# Patient Record
(demographics unavailable — no encounter records)

---

## 2024-11-27 NOTE — WORK
[Sprain/Strain] : sprain/strain [Was the competent medical cause of the injury] : was the competent medical cause of the injury [Are consistent with the injury] : are consistent with the injury [Consistent with my objective findings] : consistent with my objective findings [Does not reveal pre-existing condition(s) that may affect treatment/prognosis] : does not reveal pre-existing condition(s) that may affect treatment/prognosis [Climbing stairs/Ladders] : climbing stairs/ladders [Walking] : walking [Unknown at this time] : : unknown at this time [Patient] : patient [I provided the services listed above] :  I provided the services listed above. [FreeTextEntry1] : Fair

## 2024-11-27 NOTE — IMAGING
[Left] : left ankle [de-identified] : He is alert and oriented vital signs stable.  Examination left ankle reveals minimal swelling.  He has no tenderness on the proximal or distal fibula or the medial malleolus.  He has no tenderness around the ATFL or deltoid ligaments.  He has some mild tenderness around the CFL as well as had some tenderness around the peroneal tendons.  There is no apparent peroneal subluxation.  He has slight restricted range of motion eversion with some discomfort on resisted eversion.  He has full dorsiflexion and plantarflexion inversion.  He has no midfoot tenderness.  No tenderness on the fifth metatarsal.  Achilles tendon is intact.  No calf tenderness.  He has a normal neurologic exam Normal vascular exam Normal skin exam. No evidence for open wounds infection or compartment syndrome. [FreeTextEntry9] : X-rays left ankle 3 views healed no fracture or dislocations.  He does have a spur at the calcaneus posteriorly as well as a heel spur

## 2024-11-27 NOTE — HISTORY OF PRESENT ILLNESS
[Work related] : work related [10] : 10 [5] : 5 [Dull/Aching] : dull/aching [Constant] : constant [Leisure] : leisure [Work] : work [Rest] : rest [Ice] : ice [Walking] : walking [Full time] : Work status: full time [de-identified] : Patient is a 52-year-old male who injured his left ankle on November 26, 2024.  Patient was working as a [employer]  when he stepped on something and rolled and twisted his left ankle.  Since that time he is complaining of pain in the left ankle he localizes the pain laterally.  He says it bothers him while walking.  He is wearing a work boot.  He has not had prior treatment.  He is still working. Patient does state that the injury occurred about 3 weeks ago when he reported however that they did not give him a date of injury until yesterday as he continued to work and was not getting better [] : no [FreeTextEntry1] : Left ankle [FreeTextEntry3] : 11/26/2024 [FreeTextEntry5] : 11/26/2024 pt states rolled his left ankle at work

## 2024-11-27 NOTE — ASSESSMENT
[FreeTextEntry1] : Left ankle sprain Peroneal tendon strain  Plan anti-inflammatories with GI precautions ice to minutes on 20 minutes off and he can use an Aircast orthosis.  Did recommend at this time to use the ankle brace while walking.  He is can to try to continue working.  He will wear the support.  He will follow-up in 3 weeks.  With the RICE protocol  A request is being made to Worker's Compensation for authorization for MRI of the left ankle.  All questions were answered is agreeable with the plan.  Patient was advised if they develop any severe pain, numbness or tingling or pain with range of motion to the foot/toes they must call or go to the emergency room immediately. All the signs and symptoms of compartment syndrome were explained.  The patient understands.  This medical record was created utilizing Dragon voice recognition software.  This software is not perfect and may occasionally create typographical errors which may be reflected in the above medical record.

## 2024-12-18 NOTE — WORK
[Sprain/Strain] : sprain/strain [Was the competent medical cause of the injury] : was the competent medical cause of the injury [Are consistent with the injury] : are consistent with the injury [Consistent with my objective findings] : consistent with my objective findings [Does not reveal pre-existing condition(s) that may affect treatment/prognosis] : does not reveal pre-existing condition(s) that may affect treatment/prognosis [Climbing stairs/Ladders] : climbing stairs/ladders [Walking] : walking [Unknown at this time] : : unknown at this time [Patient] : patient [I provided the services listed above] :  I provided the services listed above. [Total (100%)] : total (100%) [Cannot return to work because ________] : cannot return to work because [unfilled] [Less than Sedentary Work:] :  Less than Sedentary Work: Unable to meet the requirement of Sedentary Work. [FreeTextEntry1] : Fair

## 2024-12-18 NOTE — HISTORY OF PRESENT ILLNESS
[Work related] : work related [10] : 10 [5] : 5 [Dull/Aching] : dull/aching [Constant] : constant [Leisure] : leisure [Work] : work [Rest] : rest [Ice] : ice [Walking] : walking [Full time] : Work status: full time [de-identified] : Patient is a 52-year-old male who injured his left ankle on November 26, 2024.  Patient was working as a [employer]  when he stepped on something and rolled and twisted his left ankle.  Since that time he is complaining of pain in the left ankle he localizes the pain laterally.  He says it bothers him while walking.  He is wearing a work boot.  He has not had prior treatment.  He is still working. Patient does state that the injury occurred about 3 weeks ago when he reported however that they did not give him a date of injury until yesterday as he continued to work and was not getting better  December 18, 2024.  Patient presents for follow-up for his left ankle.  Still using the ankle support when needed.  He says has been out of work since December 11, 2024.  He is having pain posterior laterally.  He was not approved for the MRI. [] : no [FreeTextEntry1] : Left ankle [FreeTextEntry3] : 11/26/2024 [FreeTextEntry5] : 11/26/2024 pt states rolled his left ankle at work

## 2024-12-18 NOTE — IMAGING
[Left] : left ankle [de-identified] : He is alert and oriented vital signs stable.  Examination left ankle reveals minimal swelling.  He has no tenderness on the proximal or distal fibula or the medial malleolus.  He has no tenderness around the ATFL or deltoid ligaments.  He has some mild tenderness around the CFL as well as had some tenderness around the peroneal tendons.  There is no apparent peroneal subluxation.  He has slight restricted range of motion eversion with some discomfort on resisted eversion.  He has full dorsiflexion and plantarflexion inversion.  He has no midfoot tenderness.  No tenderness on the fifth metatarsal.  Achilles tendon is intact.  No calf tenderness.  He has a normal neurologic exam Normal vascular exam Normal skin exam. No evidence for open wounds infection or compartment syndrome. [FreeTextEntry9] : X-rays left ankle 3 views healed no fracture or dislocations.  He does have a spur at the calcaneus posteriorly as well as a heel spur

## 2024-12-18 NOTE — ASSESSMENT
[FreeTextEntry1] : Left ankle sprain Peroneal tendon strain  Plan anti-inflammatories with GI precautions ice to minutes on 20 minutes off and he can use an Aircast orthosis.  Did recommend at this time to use the ankle brace while walking.  We then start him on a course of physical therapy.  He will remain out of work at this time. He will follow-up in 3 to 4 weeks.  A request is being made to Worker's Compensation for authorization for PT and an MRI of the left ankle.  All questions were answered is agreeable with the plan.  Patient was advised if they develop any severe pain, numbness or tingling or pain with range of motion to the foot/toes they must call or go to the emergency room immediately. All the signs and symptoms of compartment syndrome were explained.  The patient understands.  This medical record was created utilizing Dragon voice recognition software.  This software is not perfect and may occasionally create typographical errors which may be reflected in the above medical record.

## 2025-01-15 NOTE — IMAGING
[Left] : left ankle [de-identified] : He is alert and oriented vital signs stable.  Examination left ankle reveals minimal swelling.  He has no tenderness on the proximal or distal fibula or the medial malleolus.  He has no tenderness around the ATFL or deltoid ligaments.  He has some mild tenderness around the CFL as well as had some tenderness around the peroneal tendons.  There is no apparent peroneal subluxation.  He has slight restricted range of motion eversion with some discomfort on resisted eversion.  He has full dorsiflexion and plantarflexion inversion.  He has no midfoot tenderness.  No tenderness on the fifth metatarsal.  Achilles tendon is intact.  No calf tenderness.  He has a normal neurologic exam Normal vascular exam Normal skin exam. No evidence for open wounds infection or compartment syndrome. [FreeTextEntry9] : X-rays left ankle 3 views healed no fracture or dislocations.  He does have a spur at the calcaneus posteriorly as well as a heel spur

## 2025-01-15 NOTE — WORK
[Sprain/Strain] : sprain/strain [Was the competent medical cause of the injury] : was the competent medical cause of the injury [Are consistent with the injury] : are consistent with the injury [Consistent with my objective findings] : consistent with my objective findings [Total (100%)] : total (100%) [Does not reveal pre-existing condition(s) that may affect treatment/prognosis] : does not reveal pre-existing condition(s) that may affect treatment/prognosis [Cannot return to work because ________] : cannot return to work because [unfilled] [Climbing stairs/Ladders] : climbing stairs/ladders [Walking] : walking [Unknown at this time] : : unknown at this time [Patient] : patient [I provided the services listed above] :  I provided the services listed above. [Less than Sedentary Work:] :  Less than Sedentary Work: Unable to meet the requirement of Sedentary Work. [FreeTextEntry1] : Fair

## 2025-01-15 NOTE — DATA REVIEWED
[FreeTextEntry1] : MRI left ankle shows subacute partial tear of the anterior talar fibular and calcaneofibular ligament sprain of the spring ligament.  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  Stable osteochondral defect to the medial lateral talar dome with mild narrowing of the tibiotalar joint.  Healed avulsion injuries on both sides adores navicular joint with subacute tear and thickening the capsule.  Plantar fasciitis.  Achilles tendinopathy with peritendinous edema.  Posterior tibial tendinopathy with tenosynovitis no peroneal tendon tear.

## 2025-01-15 NOTE — ASSESSMENT
[FreeTextEntry1] : Left ankle sprain Peroneal tendon strain Osteochondral injury  Plan anti-inflammatories with GI precautions ice to minutes on 20 minutes off and he can use an Aircast orthosis.  Did recommend at this time to use the ankle brace while walking.  At this point the MRI was reviewed with him he is given a copy report.  I do want him to see one of the foot and ankle specialist Dr. Ramírez or Dr. Mckeon for consultation prior to starting physical therapy.   He will return to work on Monday Jan 20 th and I did advise him wear the Aircast.  He wants to return to work at this time full duty. All questions were answered is agreeable with the plan.  Patient was advised if they develop any severe pain, numbness or tingling or pain with range of motion to the foot/toes they must call or go to the emergency room immediately. All the signs and symptoms of compartment syndrome were explained.  The patient understands.  This medical record was created utilizing Dragon voice recognition software.  This software is not perfect and may occasionally create typographical errors which may be reflected in the above medical record.

## 2025-01-15 NOTE — HISTORY OF PRESENT ILLNESS
[Work related] : work related [10] : 10 [5] : 5 [Dull/Aching] : dull/aching [Constant] : constant [Leisure] : leisure [Work] : work [Rest] : rest [Ice] : ice [Walking] : walking [Full time] : Work status: full time [de-identified] : Patient is a 52-year-old male who injured his left ankle on November 26, 2024.  Patient was working as a [employer]  when he stepped on something and rolled and twisted his left ankle.  Since that time he is complaining of pain in the left ankle he localizes the pain laterally.  He says it bothers him while walking.  He is wearing a work boot.  He has not had prior treatment.  He is still working. Patient does state that the injury occurred about 3 weeks ago when he reported however that they did not give him a date of injury until yesterday as he continued to work and was not getting better  December 18, 2024.  Patient presents for follow-up for his left ankle.  Still using the ankle support when needed.  He says has been out of work since December 11, 2024.  He is having pain posterior laterally.  He was not approved for the MRI.  January 15, 2025.  Patient presents for follow-up for his left ankle.  He had the MRI performed.  He did not start physical therapy as of yet.  He wants to return to work on Monday, January 20, 2025. [] : no [FreeTextEntry1] : Left ankle [FreeTextEntry3] : 11/26/2024 [FreeTextEntry5] : 11/26/2024 pt states rolled his left ankle at work

## 2025-01-22 NOTE — HISTORY OF PRESENT ILLNESS
[Work related] : work related [Sudden] : sudden [0] : 0 [Localized] : localized [Rest] : rest [Full time] : Work status: full time [de-identified] :   11/26/24  L ankle  [employer]   1/22/25  L ankle injured getting out of truck and "rolled" ankle.  Able to ambulate but then developed increasing pain.  Seen by Quang 11/27/24.  Used ankle brace x 2 weeks  Returned to work 1/20/24 [] : no [FreeTextEntry1] : left ankle  [FreeTextEntry3] : 11-26-24 [FreeTextEntry5] : rolled ankle  [FreeTextEntry6] : tender to touch [de-identified] : Dr. Negrete [de-identified] : xr and MRI [facility] [de-identified] : Rafi

## 2025-01-22 NOTE — DATA REVIEWED
[MRI] : MRI [Left] : left [Ankle] : ankle [Report was reviewed and noted in the chart] : The report was reviewed and noted in the chart [I independently reviewed and interpreted images and report] : I independently reviewed and interpreted images and report [FreeTextEntry1] : 1/7/25  achilles tendinosis w/calcaneal edema, ocd medial talus, scarred lateral ligaments

## 2025-01-22 NOTE — WORK
[Sprain/Strain] : sprain/strain [Was the competent medical cause of the injury] : was the competent medical cause of the injury [Consistent with my objective findings] : consistent with my objective findings [Partial] : partial [Does not reveal pre-existing condition(s) that may affect treatment/prognosis] : does not reveal pre-existing condition(s) that may affect treatment/prognosis [Cannot return to work because ________] : cannot return to work because [unfilled] [No Rx restrictions] : No Rx restrictions. [I provided the services listed above] :  I provided the services listed above. [FreeTextEntry1] : good

## 2025-06-24 NOTE — WORK
[Sprain/Strain] : sprain/strain [Was the competent medical cause of the injury] : was the competent medical cause of the injury [Are consistent with the injury] : are consistent with the injury [Consistent with my objective findings] : consistent with my objective findings [Does not reveal pre-existing condition(s) that may affect treatment/prognosis] : does not reveal pre-existing condition(s) that may affect treatment/prognosis [Patient] : patient [No Rx restrictions] : No Rx restrictions. [I provided the services listed above] :  I provided the services listed above.

## 2025-06-24 NOTE — HISTORY OF PRESENT ILLNESS
[Work related] : work related [Gradual] : gradual [Sudden] : sudden [Burning] : burning [Radiating] : radiating [Tingling] : tingling [Rest] : rest [Full time] : Work status: full time [de-identified] : W/C DOI 5/14/25 ([place] )  53 year old RHD M, LHD, ambidextrous for anything heavy. Presents with Right shoulder pain, constant and throbbing in nature.  When he goes to use arm for anything and lifts pain is sharp.  + weakness. Pain radiates front, side and back of shoulder. He was injured when he lifted heavy metal strand cutters [place] and felt acute, sharp pain and heat.  Reaching back and behind him is worse. He is experiencing night pain, cannot lie on his right side.  He avoids lifting now with his R arm.  No prior hx shoulder issues.  [] : no [FreeTextEntry1] : right shoulder [FreeTextEntry3] : 5-14-25 [FreeTextEntry5] : working in someones backyard [FreeTextEntry6] : numbness [FreeTextEntry7] : occ arm pain [de-identified] : over use lifting heavy, reaching out  [de-identified] : Rafi

## 2025-06-24 NOTE — ASSESSMENT
[FreeTextEntry1] : W/C DOI 5/14/25  Case discussed nature of diagnosis and treatment options discussed MRI R shoulder to evaluate RCT/ injury pathology Ice as reviewed pain guide activities will continue OTC nsaids  CSI offered he prefers to work precautions for any lifting return after MRI for review

## 2025-06-24 NOTE — IMAGING
[Right] : right shoulder [There are no fractures, subluxations or dislocations. No significant abnormalities are seen] : There are no fractures, subluxations or dislocations. No significant abnormalities are seen [Degenerative change] : Degenerative change [Type 2 acromion] : Type 2 acromion [AC Joint Arthrosis] : AC Joint Arthrosis [FreeTextEntry1] : small inferior GH spur

## 2025-06-24 NOTE — HISTORY OF PRESENT ILLNESS
[Work related] : work related [Gradual] : gradual [Sudden] : sudden [Burning] : burning [Radiating] : radiating [Tingling] : tingling [Rest] : rest [Full time] : Work status: full time [de-identified] : W/C DOI 5/14/25 ([place] )  53 year old RHD M, LHD, ambidextrous for anything heavy. Presents with Right shoulder pain, constant and throbbing in nature.  When he goes to use arm for anything and lifts pain is sharp.  + weakness. Pain radiates front, side and back of shoulder. He was injured when he lifted heavy metal strand cutters [place] and felt acute, sharp pain and heat.  Reaching back and behind him is worse. He is experiencing night pain, cannot lie on his right side.  He avoids lifting now with his R arm.  No prior hx shoulder issues.  [] : no [FreeTextEntry1] : right shoulder [FreeTextEntry3] : 5-14-25 [FreeTextEntry5] : working in someones backyard [FreeTextEntry6] : numbness [FreeTextEntry7] : occ arm pain [de-identified] : over use lifting heavy, reaching out  [de-identified] : Rafi

## 2025-07-29 NOTE — DISCUSSION/SUMMARY
[de-identified] : General Dx Discussion The patient was advised of the diagnosis. The natural history of the pathology was explained in full to the patient in layman's terms. All questions were answered. The risks and benefits of surgical and non-surgical treatment alternatives were explained in full to the patient.  Case Discussed. MRI reviewed with patient. Surg/Nonsurg options discussed. Advised best treatment option may be surgery for TSR large retracted tear with some atrophy where RCR may not heal.  Therefore will refer to Dr. Dey for further evaluation and treatment. Offered patient a CSI today, but he declines.     Entered by Lou JANE acting as a scribe. Instructions: Dr. Negrete- The documentation recorded by the scribe accurately reflects the service I personally performed and the decisions made by me.

## 2025-07-29 NOTE — WORK
[Does not reveal pre-existing condition(s) that may affect treatment/prognosis] : does not reveal pre-existing condition(s) that may affect treatment/prognosis [Patient] : patient [No Rx restrictions] : No Rx restrictions. [I provided the services listed above] :  I provided the services listed above.

## 2025-07-29 NOTE — HISTORY OF PRESENT ILLNESS
[Work related] : work related [Sudden] : sudden [5] : 5 [Burning] : burning [Radiating] : radiating [Shooting] : shooting [Nothing helps with pain getting better] : Nothing helps with pain getting better [Full time] : Work status: full time [de-identified] :  5/14/25 Here for f/u right shoulder mri results. He continues to have pain.  [] : no [FreeTextEntry1] : right shoulder [FreeTextEntry3] : 5-14-25 [FreeTextEntry7] : to the elbow [de-identified] : MRI [facility]

## 2025-07-29 NOTE — DATA REVIEWED
[MRI] : MRI [Right] : of the right [Shoulder] : shoulder [Report was reviewed and noted in the chart] : The report was reviewed and noted in the chart [FreeTextEntry1] : large full thickness retracted tear of the distal supraspinatous tendon extending to the anterior distal infraspinatous tendon. complete tear of long head of biceps.